# Patient Record
Sex: FEMALE | Race: WHITE | NOT HISPANIC OR LATINO | URBAN - METROPOLITAN AREA
[De-identification: names, ages, dates, MRNs, and addresses within clinical notes are randomized per-mention and may not be internally consistent; named-entity substitution may affect disease eponyms.]

---

## 2020-07-20 ENCOUNTER — VIRTUAL VISIT (OUTPATIENT)
Dept: FAMILY MEDICINE | Facility: OTHER | Age: 25
End: 2020-07-20

## 2020-07-21 DIAGNOSIS — R05.9 COUGH: Primary | ICD-10-CM

## 2020-07-21 PROCEDURE — U0003 INFECTIOUS AGENT DETECTION BY NUCLEIC ACID (DNA OR RNA); SEVERE ACUTE RESPIRATORY SYNDROME CORONAVIRUS 2 (SARS-COV-2) (CORONAVIRUS DISEASE [COVID-19]), AMPLIFIED PROBE TECHNIQUE, MAKING USE OF HIGH THROUGHPUT TECHNOLOGIES AS DESCRIBED BY CMS-2020-01-R: HCPCS | Performed by: FAMILY MEDICINE

## 2020-07-21 NOTE — LETTER
July 24, 2020        Gisela FREDO [address]  [place] [address]    This letter provides a written record that you were tested for COVID-19 on 7/21/20.       Your result was negative. This means that we didn t find the virus that causes COVID-19 in your sample. A test may show negative when you do actually have the virus. This can happen when the virus is in the early stages of infection, before you feel illness symptoms.    If you have symptoms   Stay home and away from others (self-isolate) until you meet ALL of the guidelines below:    You ve had no fever--and no medicine that reduces fever--for 3 full days (72 hours). And      Your other symptoms have gotten better. For example, your cough or breathing has improved. And     At least 10 days have passed since your symptoms started.    During this time:    Stay home. Don t go to work, school or anywhere else.     Stay in your own room, including for meals. Use your own bathroom if you can.    Stay away from others in your home. No hugging, kissing or shaking hands. No visitors.    Clean  high touch  surfaces often (doorknobs, counters, handles, etc.). Use a household cleaning spray or wipes. You can find a full list on the EPA website at www.epa.gov/pesticide-registration/list-n-disinfectants-use-against-sars-cov-2.    Cover your mouth and nose with a mask, tissue or washcloth to avoid spreading germs.    Wash your hands and face often with soap and water.    Going back to work  Check with your employer for any guidelines to follow for going back to work.    Employers: This document serves as formal notice that your employee tested negative for COVID-19, as of the testing date shown above.

## 2020-07-22 LAB
SARS-COV-2 RNA SPEC QL NAA+PROBE: NOT DETECTED
SPECIMEN SOURCE: NORMAL

## 2020-07-22 NOTE — PROGRESS NOTES
"Date: 2020 10:28:32  Clinician: Ricki Padilla  Clinician NPI: [id number]  Patient: JEAN TURNER  Patient : 1995  Patient Address: [address], [place] [address]  Patient Phone: [phone]  Visit Protocol: URI  Patient Summary:  JEAN is a 25 year old ( : 1995 ) female who initiated a Visit for COVID-19 (Coronavirus) evaluation and screening. When asked the question \"Please sign me up to receive news, health information and promotions. \", JEAN responded \"No\".    JEAN states her symptoms started 1-2 days ago.   Her symptoms consist of malaise, a headache, and a cough.   Symptom details     Cough: JEAN coughs a few times an hour and her cough is not more bothersome at night. Phlegm does not come into her throat when she coughs. She does not believe her cough is caused by post-nasal drip.     Headache: She states the headache is mild (1-3 on a 10 point pain scale).      JEAN denies having wheezing, nausea, teeth pain, ageusia, diarrhea, vomiting, rhinitis, ear pain, chills, sore throat, myalgias, anosmia, facial pain or pressure, fever, and nasal congestion. She also denies having recent facial or sinus surgery in the past 60 days and taking antibiotic medication in the past month. She is not experiencing dyspnea.   Precipitating events  She has not recently been exposed to someone with influenza. JEAN has not been in close contact with any high risk individuals.   Pertinent COVID-19 (Coronavirus) information  In the past 14 days, JEAN has not worked in a congregate living setting.   She does not work or volunteer as healthcare worker or a  and does not work or volunteer in a healthcare facility.   JEAN also has not lived in a congregate living setting in the past 14 days. She does not live with a healthcare worker.   JEAN has not had a close contact with a laboratory-confirmed COVID-19 patient within 14 days of symptom onset.   " Pertinent medical history  JEAN does not get yeast infections when she takes antibiotics.   JEAN does not need a return to work/school note.   Weight: 135 lbs   JEAN does not smoke or use smokeless tobacco.   She denies pregnancy and denies breastfeeding. She has menstruated in the past month.   Weight: 135 lbs    MEDICATIONS: No current medications, ALLERGIES: NKDA  Clinician Response:  Dear JEAN,   Your symptoms show that you may have coronavirus (COVID-19). This illness can cause fever, cough and trouble breathing. Many people get a mild case and get better on their own. Some people can get very sick.  What should I do?  We would like to test you for this virus.   1. Please call [phone] to schedule your visit. Explain that you were referred by [facility] to have a COVID-19 test. Be ready to share your [facility] visit ID number.  The following will serve as your written order for this COVID Test, ordered by me, for the indication of suspected COVID [Z20.828]: The test will be ordered in [employer], our electronic health record, after you are scheduled. It will show as ordered and authorized by Toni Grant MD.  Order: COVID-19 (Coronavirus) PCR for SYMPTOMATIC testing from [facility].      2. When it's time for your COVID test:  Stay at least 6 feet away from others. (If someone will drive you to your test, stay in the backseat, as far away from the  as you can.)   Cover your mouth and nose with a mask, tissue or washcloth.  Go straight to the testing site. Don't make any stops on the way there or back.      3.Starting now: Stay home and away from others (self-isolate) until:   You've had no fever---and no medicine that reduces fever---for 3 full days (72 hours). And...   Your other symptoms have gotten better. For example, your cough or breathing has improved. And...   At least 10 days have passed since your symptoms started.       During this time, don't leave the house except for testing or medical  "care.   Stay in your own room, even for meals. Use your own bathroom if you can.   Stay away from others in your home. No hugging, kissing or shaking hands. No visitors.  Don't go to work, school or anywhere else.    Clean \"high touch\" surfaces often (doorknobs, counters, handles, etc.). Use a household cleaning spray or wipes. You'll find a full list of  on the EPA website: www.epa.gov/pesticide-registration/list-n-disinfectants-use-against-sars-cov-2.   Cover your mouth and nose with a mask, tissue or washcloth to avoid spreading germs.  Wash your hands and face often. Use soap and water.  Caregivers in these groups are at risk for severe illness due to COVID-19:  o People 65 years and older  o People who live in a nursing home or long-term care facility  o People with chronic disease (lung, heart, cancer, diabetes, kidney, liver, immunologic)  o People who have a weakened immune system, including those who:   Are in cancer treatment  Take medicine that weakens the immune system, such as corticosteroids  Had a bone marrow or organ transplant  Have an immune deficiency  Have poorly controlled HIV or AIDS  Are obese (body mass index of 40 or higher)  Smoke regularly   o Caregivers should wear gloves while washing dishes, handling laundry and cleaning bedrooms and bathrooms.  o Use caution when washing and drying laundry: Don't shake dirty laundry, and use the warmest water setting that you can.  o For more tips, go to www.cdc.gov/coronavirus/2019-ncov/downloads/10Things.pdf.    4.Sign up for [employer]. We know it's scary to hear that you might have COVID-19. We want to track your symptoms to make sure you're okay over the next 2 weeks. Please look for an email from [facility]---this is a free, online program that we'll use to keep in touch. To sign up, follow the link in the email. Learn more at [email or web address]  How can I take care of myself?   Get lots of rest. Drink extra fluids " (unless a doctor has told you not to).   Take Tylenol (acetaminophen) for fever or pain. If you have liver or kidney problems, ask your family doctor if it's okay to take Tylenol.   Adults can take either:    650 mg (two 325 mg pills) every 4 to 6 hours, or...   1,000 mg (two 500 mg pills) every 8 hours as needed.    Note: Don't take more than 3,000 mg in one day. Acetaminophen is found in many medicines (both prescribed and over-the-counter medicines). Read all labels to be sure you don't take too much.   For children, check the Tylenol bottle for the right dose. The dose is based on the child's age or weight.    If you have other health problems (like cancer, heart failure, an organ transplant or severe kidney disease): Call your specialty clinic if you don't feel better in the next 2 days.       Know when to call 911. Emergency warning signs include:    Trouble breathing or shortness of breath Pain or pressure in the chest that doesn't go away Feeling confused like you haven't felt before, or not being able to wake up Bluish-colored lips or face.  Where can I get more information?   [facility] -- About COVID-19: [email or web address]   CDC -- What to Do If You're Sick: www.cdc.gov/coronavirus/2019-ncov/about/steps-when-sick.html   CDC -- Ending Home Isolation: www.cdc.gov/coronavirus/2019-ncov/hcp/disposition-in-home-patients.html   CDC -- Caring for Someone: www.cdc.gov/coronavirus/2019-ncov/if-you-are-sick/care-for-someone.html   [facility] -- Interim Guidance for Hospital Discharge to Home: www.health.[facility].[place]/diseases/coronavirus/hcp/hospdischarge.pdf   [facility] clinical trials (COVID-19 research studies): clinicalaffairs.[facility]/umn-clinical-trials    Below are the COVID-19 hotlines at the [place] Department of Health ([facility]). Interpreters are available.    For health questions: Call 526-360-9868 or 1-508.437.6784 (7 a.m. to 7 p.m.) For questions about schools and childcare: Call  507-099-8436 or 1-821.605.8038 (7 a.m. to 7 p.m.)    Diagnosis: Cough  Diagnosis ICD: R05

## 2020-11-11 ASSESSMENT — ANXIETY QUESTIONNAIRES
2. NOT BEING ABLE TO STOP OR CONTROL WORRYING: NOT AT ALL
7. FEELING AFRAID AS IF SOMETHING AWFUL MIGHT HAPPEN: NOT AT ALL
3. WORRYING TOO MUCH ABOUT DIFFERENT THINGS: NOT AT ALL
1. FEELING NERVOUS, ANXIOUS, OR ON EDGE: NOT AT ALL
6. BECOMING EASILY ANNOYED OR IRRITABLE: SEVERAL DAYS
4. TROUBLE RELAXING: NOT AT ALL
5. BEING SO RESTLESS THAT IT IS HARD TO SIT STILL: NOT AT ALL
GAD7 TOTAL SCORE: 1

## 2020-11-12 ASSESSMENT — ENCOUNTER SYMPTOMS
BACK PAIN: 0
NECK PAIN: 0
MUSCLE CRAMPS: 0
ARTHRALGIAS: 1
MYALGIAS: 0
JOINT SWELLING: 0
STIFFNESS: 1
SKIN CHANGES: 0
MUSCLE WEAKNESS: 0
NAIL CHANGES: 0
POOR WOUND HEALING: 0

## 2020-11-12 ASSESSMENT — ANXIETY QUESTIONNAIRES
GAD7 TOTAL SCORE: 1
GAD7 TOTAL SCORE: 1
7. FEELING AFRAID AS IF SOMETHING AWFUL MIGHT HAPPEN: NOT AT ALL

## 2020-11-13 ASSESSMENT — ANXIETY QUESTIONNAIRES: GAD7 TOTAL SCORE: 1

## 2020-12-02 NOTE — PROGRESS NOTES
Progress Note    SUBJECTIVE:  Gisela Madison is an 25 year old, , who requests an Annual Preventive Exam. 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 She now lives in [place] with her boyfriend.     Concerns today include:   1. Preventative health visit: requests breast exam today. Pap up to date. Desires flu shot today.     2. Feels hormones may be out of line. Hx of acne that is difficult to treat. Has facial hair on chin that she gets lasered. Also reports excess hair on inner thighs. Gisela also notes feeling cold all the time; she denies constipation, mental sluggishness, weight gain, dry skin/hair/nails.     3. Baseline stuffy nose for last few months. Questioning allergies? Doesn't feel 100% on most days.This is tolerable but wondering why she feels this.     Contraception: Kyleena IUD inserted by Dr Day 19 during pelvic u/s which had normal findings    Sexual health: no concerns, denies dyspareunia and vaginal symptoms     Declines STI testing as she is in a monogamous relationship. Last in 2019- negative for gonorrhea and chlamydia.      Mental health: doing as best as she can right now. No concerns    Diet: healthy diet with adequate fruits, vegetables, and calcium    Exercise: 3-5 days per week     Menstrual History: menses every month. Reports premenstrual symptoms of breast tenderness, cramping, and mood changes. Hx of regular periods. Menarche age 9-10 years old with regular cycles since then.   Menstrual History 12/3/2020 12/3/2020   LAST MENSTRUAL PERIOD 2020 -   Menarche Age - 10   Period Cycle (Days) - kyleena iud 28-30 day   Period Duration (Days) - 2-3  then spots for a week   Method of Contraception - Other (see comments)   Period Pattern - Regular   Menstrual Flow - Light   Dysmenorrhea - Mild   PMS Symptoms - Cramping;Mood Changes   Reviewed Today - Yes     Last Pap: 2019- NIL  History  of abnormal Pap smear: NO - age 21-29 PAP every 3 years recommended    Mammogram current: not applicable    Last Colonoscopy: n/a    HISTORY:       levonorgestrel (KYLEENA) 19.5 MG IUD, 1 each by Intrauterine route       zolpidem (AMBIEN) 5 MG tablet, Take 5 mg by mouth as needed       tretinoin (RETIN-A) 0.025 % external cream, 1 g daily    No current facility-administered medications on file prior to visit.     No Known Allergies  Immunization History   Administered Date(s) Administered     DTaP, Unspecified 2012     HPV Quadrivalent 2010, 10/28/2010, 2011     Hep B, Peds or Adolescent 1995, 1995, 1995     HepA-ped 2 Dose 2006, 2007     Hpv, Unspecified  2010, 10/28/2010, 2011     Influenza Vaccine IM > 6 months Valent IIV4 2018, 2020, 2020     MENINGOCOCCAL, HISTORIC, UNKOWN SEROGROUPS 2010     Mantoux Tuberculin Skin Test 2018     Meningococcal,unspecified 2010     Typhoid Oral 2019     OB History    Para Term  AB Living   0 0 0 0 0 0   SAB TAB Ectopic Multiple Live Births   0 0 0 0 0     History reviewed. No pertinent past medical history.  Past Surgical History:   Procedure Laterality Date     anoidectomy       AS RAD RESEC TONSIL/PILLARS       LASIK       wisdom teeth       Family History   Problem Relation Age of Onset     Skin Cancer Mother      Thyroid Disease Mother         multinodular goiter     Diabetes Paternal Grandfather      Skin Cancer Maternal Aunt      Social History     Socioeconomic History     Marital status: Single     Spouse name: None     Number of children: None     Years of education: None     Highest education level: None   Occupational History     None   Social Needs     Financial resource strain: None     Food insecurity     Worry: None     Inability: None     Transportation needs     Medical: None     Non-medical: None   Tobacco Use     Smoking status: Never Smoker      Smokeless tobacco: Never Used   Substance and Sexual Activity     Alcohol use: Yes     Frequency: Monthly or less     Drinks per session: 1 or 2     Binge frequency: Never     Comment: occasionally     Drug use: Never     Sexual activity: Yes     Partners: Male     Birth control/protection: I.U.D.   Lifestyle     Physical activity     Days per week: None     Minutes per session: None     Stress: None   Relationships     Social connections     Talks on phone: None     Gets together: None     Attends Worship service: None     Active member of club or organization: None     Attends meetings of clubs or organizations: None     Relationship status: None     Intimate partner violence     Fear of current or ex partner: None     Emotionally abused: None     Physically abused: None     Forced sexual activity: None   Other Topics Concern     None   Social History Narrative    How much exercise per week? 3-5 days    How much calcium per day? [place] milk and foods       How much caffeine per day? 2 cups    How much vitamin D per day? In foods    Do you/your family wear seatbelts?  Yes    Do you/your family use safety helmets? Yes    Do you/your family use sunscreen? Yes    Do you/your family keep firearms in the home? No    Do you/your family have a smoke detector(s)? Yes    [facility] 12-3-2020       Answers for HPI/ROS submitted by the patient on 11/12/2020   CANDACE 7 TOTAL SCORE: 1  General Symptoms: No  Skin Symptoms: Yes  HENT Symptoms: No  EYE SYMPTOMS: No  HEART SYMPTOMS: No  LUNG SYMPTOMS: No  INTESTINAL SYMPTOMS: No  URINARY SYMPTOMS: No  GYNECOLOGIC SYMPTOMS: No  BREAST SYMPTOMS: No  SKELETAL SYMPTOMS: Yes  BLOOD SYMPTOMS: No  NERVOUS SYSTEM SYMPTOMS: No  MENTAL HEALTH SYMPTOMS: No  Changes in hair: Yes  Changes in moles/birth marks: No  Itching: No  Rashes: No  Changes in nails: No  Acne: No  Hair in places you don't want it: Yes  Change in facial hair: Yes  Warts: Yes  Non-healing sores: No  Scarring: No  Flaking of  "skin: No  Color changes of hands/feet in cold : No  Sun sensitivity: No  Skin thickening: No  Back pain: No  Muscle aches: No  Neck pain: No  Swollen joints: No  Joint pain: Yes  Bone pain: No  Muscle cramps: No  Muscle weakness: No  Joint stiffness: Yes  Bone fracture: No    PHQ-9 SCORE 12/3/2020   PHQ-9 Total Score 1     CANDACE-7 SCORE 11/11/2020 11/11/2020   Total Score 1 (minimal anxiety) 1 (minimal anxiety)   Total Score 1 1       EXAM:  Blood pressure 117/75, pulse 66, height 1.626 m (5' 4\"), weight 61.2 kg (135 lb), last menstrual period 11/11/2020. Body mass index is 23.17 kg/m .  General - pleasant female in no acute distress.  Skin - no suspicious lesions or rashes  EENT-  euthyroid with out palpable nodules  Neck - supple without lymphadenopathy.  Lungs - clear to auscultation bilaterally.  Heart - regular rate and rhythm without murmur.  Abdomen - BS active x4, soft, nontender, nondistended, no masses or organomegaly noted.  Musculoskeletal - no gross deformities.  Neurological - normal strength, sensation, and mental status.    Breast Exam:  Breast: Without visible skin changes. No dimpling or lesions seen.   Breasts supple, non-tender with palpation, no dominant mass, nodularity, or nipple discharge noted bilaterally. Axillary nodes negative.      Pelvic Exam:  EG/BUS: Normal genital architecture without lesions, erythema or abnormal secretions; Bartholin's, Urethra, Robert Lee's normal   Urethral meatus: normal   Urethra: no masses, tenderness, or scarring   Bladder: no masses or tenderness   Vagina: moist, pink, rugae with creamy, white and odorless secretions  Cervix: no lesions and IUD strings extending from external os.  Uterus: anteverted, non-tender, mobile  Adnexa: Within normal limits and No masses, nodularity, tenderness  Rectum: anus normal     ASSESSMENT:  Encounter Diagnoses   Name Primary?     Visit for preventive health examination Yes     Screening for thyroid disorder      Screening for " cholesterol level      Sensation of feeling cold      Hirsutism      Surveillance of intrauterine contraceptive device      PLAN:   Orders Placed This Encounter   Procedures     FLU VAC PRESRV FREE QUAD SPLIT VIR 3+YRS IM     TSH with free T4 reflex     Lipid Profile     Testosterone Free and Total   - TSH to assess for thyroid disorder  - Testosterone to assess for PCOS   - Lipid panel for preventative health   - Flu vaccine given today    - Bimanual and pelvic exam performed to check for IUD strings.  Normal IUD string check today; no evidence of displacement. Educated on how to check IUD strings at home. Kyleeena IUD effective until 6/2024.  - Sent patient [employer] message to advise use of antihistamine to manage symptoms of stuffy nose. Notify provider if symptoms persist.     - Flu vaccine given today  - Next pap smear due 2022    Will notify patient of results by [employer] if normal; will call if abnormal to discuss plan for care.     Additional teaching done at this visit regarding calcium (1200 mg per day), self breast exam, exercise, birth control, mental health and weight/diet.    Return to clinic in one year.  Follow-up as needed.    I, Roxann Hamilton, completed the PFSH and ROS. I then acted as a scribe for MARYJO Sellers, for the remainder of the visit.  Roxann Hamilton BSN, RN, MANJULA, EDUARDO Student     I agree with the PFSH and ROS as completed by the MARYJO Student, except for changes made by me.  The remainder of the encounter was performed by me and scribed by the MARYJO Student.  The scribed note accurately reflects my personal services and decisions made by me.  Kiki Lerner DNP, MAYO, MARYJO

## 2020-12-03 ENCOUNTER — OFFICE VISIT (OUTPATIENT)
Dept: OBGYN | Facility: CLINIC | Age: 25
End: 2020-12-03
Attending: NURSE PRACTITIONER
Payer: COMMERCIAL

## 2020-12-03 VITALS
HEART RATE: 66 BPM | HEIGHT: 64 IN | SYSTOLIC BLOOD PRESSURE: 117 MMHG | WEIGHT: 135 LBS | BODY MASS INDEX: 23.05 KG/M2 | DIASTOLIC BLOOD PRESSURE: 75 MMHG

## 2020-12-03 DIAGNOSIS — Z00.00 VISIT FOR PREVENTIVE HEALTH EXAMINATION: Primary | ICD-10-CM

## 2020-12-03 DIAGNOSIS — Z13.29 SCREENING FOR THYROID DISORDER: ICD-10-CM

## 2020-12-03 DIAGNOSIS — L68.0 HIRSUTISM: ICD-10-CM

## 2020-12-03 DIAGNOSIS — Z30.431 SURVEILLANCE OF INTRAUTERINE CONTRACEPTIVE DEVICE: ICD-10-CM

## 2020-12-03 DIAGNOSIS — Z13.220 SCREENING FOR CHOLESTEROL LEVEL: ICD-10-CM

## 2020-12-03 DIAGNOSIS — R68.89 SENSATION OF FEELING COLD: ICD-10-CM

## 2020-12-03 LAB
CHOLEST SERPL-MCNC: 149 MG/DL
HDLC SERPL-MCNC: 64 MG/DL
LDLC SERPL CALC-MCNC: 66 MG/DL
NONHDLC SERPL-MCNC: 85 MG/DL
TRIGL SERPL-MCNC: 95 MG/DL
TSH SERPL DL<=0.005 MIU/L-ACNC: 1.87 MU/L (ref 0.4–4)

## 2020-12-03 PROCEDURE — 80061 LIPID PANEL: CPT | Performed by: NURSE PRACTITIONER

## 2020-12-03 PROCEDURE — 84443 ASSAY THYROID STIM HORMONE: CPT | Performed by: NURSE PRACTITIONER

## 2020-12-03 PROCEDURE — 36415 COLL VENOUS BLD VENIPUNCTURE: CPT | Performed by: NURSE PRACTITIONER

## 2020-12-03 PROCEDURE — G0008 ADMIN INFLUENZA VIRUS VAC: HCPCS

## 2020-12-03 PROCEDURE — G0463 HOSPITAL OUTPT CLINIC VISIT: HCPCS | Mod: 25

## 2020-12-03 PROCEDURE — 84403 ASSAY OF TOTAL TESTOSTERONE: CPT | Performed by: NURSE PRACTITIONER

## 2020-12-03 PROCEDURE — 90686 IIV4 VACC NO PRSV 0.5 ML IM: CPT

## 2020-12-03 PROCEDURE — 250N000011 HC RX IP 250 OP 636

## 2020-12-03 PROCEDURE — 84270 ASSAY OF SEX HORMONE GLOBUL: CPT | Performed by: NURSE PRACTITIONER

## 2020-12-03 PROCEDURE — 99385 PREV VISIT NEW AGE 18-39: CPT | Performed by: NURSE PRACTITIONER

## 2020-12-03 RX ORDER — TRETINOIN 0.25 MG/G
1 CREAM TOPICAL DAILY
COMMUNITY
Start: 2020-11-10

## 2020-12-03 RX ORDER — ZOLPIDEM TARTRATE 5 MG/1
5 TABLET ORAL PRN
COMMUNITY
Start: 2019-07-08

## 2020-12-03 SDOH — HEALTH STABILITY: MENTAL HEALTH: HOW OFTEN DO YOU HAVE 6 OR MORE DRINKS ON ONE OCCASION?: NEVER

## 2020-12-03 SDOH — HEALTH STABILITY: MENTAL HEALTH: HOW OFTEN DO YOU HAVE A DRINK CONTAINING ALCOHOL?: MONTHLY OR LESS

## 2020-12-03 SDOH — HEALTH STABILITY: MENTAL HEALTH: HOW MANY STANDARD DRINKS CONTAINING ALCOHOL DO YOU HAVE ON A TYPICAL DAY?: 1 OR 2

## 2020-12-03 ASSESSMENT — MIFFLIN-ST. JEOR: SCORE: 1342.36

## 2020-12-03 ASSESSMENT — PAIN SCALES - GENERAL: PAINLEVEL: NO PAIN (0)

## 2020-12-03 ASSESSMENT — PATIENT HEALTH QUESTIONNAIRE - PHQ9: SUM OF ALL RESPONSES TO PHQ QUESTIONS 1-9: 1

## 2020-12-03 NOTE — LETTER
12/3/2020       RE: Gisela Madison  [address]  [place] [address]     Dear Colleague,    Thank you for referring your patient, Gisela Madison, to the [facility] [place] at [facility]. Please see a copy of my visit note below.    Progress Note    SUBJECTIVE:  Gisela Madison is an 25 year old, , who requests an Annual Preventive Exam. 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 She now lives in [place] with her boyfriend.     Concerns today include:   1. Preventative health visit: requests breast exam today. Pap up to date. Desires flu shot today.     2. Feels hormones may be out of line. Hx of acne that is difficult to treat. Has facial hair on chin that she gets lasered. Also reports excess hair on inner thighs. Gisela also notes feeling cold all the time; she denies constipation, mental sluggishness, weight gain, dry skin/hair/nails.     3. Baseline stuffy nose for last few months. Questioning allergies? Doesn't feel 100% on most days.This is tolerable but wondering why she feels this.     Contraception: Kyleena IUD inserted by Dr Day 19 during pelvic u/s which had normal findings    Sexual health: no concerns, denies dyspareunia and vaginal symptoms     Declines STI testing as she is in a monogamous relationship. Last in 2019- negative for gonorrhea and chlamydia.      Mental health: doing as best as she can right now. No concerns    Diet: healthy diet with adequate fruits, vegetables, and calcium    Exercise: 3-5 days per week     Menstrual History: menses every month. Reports premenstrual symptoms of breast tenderness, cramping, and mood changes. Hx of regular periods. Menarche age 9-10 years old with regular cycles since then.   Menstrual History 12/3/2020 12/3/2020   LAST MENSTRUAL PERIOD 2020 -   Menarche Age - 10   Period  Cycle (Days) - kyleena iud 28-30 day   Period Duration (Days) - 2-3  then spots for a week   Method of Contraception - Other (see comments)   Period Pattern - Regular   Menstrual Flow - Light   Dysmenorrhea - Mild   PMS Symptoms - Cramping;Mood Changes   Reviewed Today - Yes     Last Pap: 2019- NIL  History of abnormal Pap smear: NO - age 21-29 PAP every 3 years recommended    Mammogram current: not applicable    Last Colonoscopy: n/a    HISTORY:       levonorgestrel (KYLEENA) 19.5 MG IUD, 1 each by Intrauterine route       zolpidem (AMBIEN) 5 MG tablet, Take 5 mg by mouth as needed       tretinoin (RETIN-A) 0.025 % external cream, 1 g daily    No current facility-administered medications on file prior to visit.     No Known Allergies  Immunization History   Administered Date(s) Administered     DTaP, Unspecified 2012     HPV Quadrivalent 2010, 10/28/2010, 2011     Hep B, Peds or Adolescent 1995, 1995, 1995     HepA-ped 2 Dose 2006, 2007     Hpv, Unspecified  2010, 10/28/2010, 2011     Influenza Vaccine IM > 6 months Valent IIV4 2018, 2020, 2020     MENINGOCOCCAL, HISTORIC, UNKOWN SEROGROUPS 2010     Mantoux Tuberculin Skin Test 2018     Meningococcal,unspecified 2010     Typhoid Oral 2019     OB History    Para Term  AB Living   0 0 0 0 0 0   SAB TAB Ectopic Multiple Live Births   0 0 0 0 0     History reviewed. No pertinent past medical history.  Past Surgical History:   Procedure Laterality Date     anoidectomy       AS RAD RESEC TONSIL/PILLARS       LASIK       wisdom teeth       Family History   Problem Relation Age of Onset     Skin Cancer Mother      Thyroid Disease Mother         multinodular goiter     Diabetes Paternal Grandfather      Skin Cancer Maternal Aunt      Social History     Socioeconomic History     Marital status: Single     Spouse name: None     Number of children: None      Years of education: None     Highest education level: None   Occupational History     None   Social Needs     Financial resource strain: None     Food insecurity     Worry: None     Inability: None     Transportation needs     Medical: None     Non-medical: None   Tobacco Use     Smoking status: Never Smoker     Smokeless tobacco: Never Used   Substance and Sexual Activity     Alcohol use: Yes     Frequency: Monthly or less     Drinks per session: 1 or 2     Binge frequency: Never     Comment: occasionally     Drug use: Never     Sexual activity: Yes     Partners: Male     Birth control/protection: I.U.D.   Lifestyle     Physical activity     Days per week: None     Minutes per session: None     Stress: None   Relationships     Social connections     Talks on phone: None     Gets together: None     Attends Cheondoism service: None     Active member of club or organization: None     Attends meetings of clubs or organizations: None     Relationship status: None     Intimate partner violence     Fear of current or ex partner: None     Emotionally abused: None     Physically abused: None     Forced sexual activity: None   Other Topics Concern     None   Social History Narrative    How much exercise per week? 3-5 days    How much calcium per day? Alomond milk and foods       How much caffeine per day? 2 cups    How much vitamin D per day? In foods    Do you/your family wear seatbelts?  Yes    Do you/your family use safety helmets? Yes    Do you/your family use sunscreen? Yes    Do you/your family keep firearms in the home? No    Do you/your family have a smoke detector(s)? Yes    [facility] 12-3-2020       Answers for HPI/ROS submitted by the patient on 11/12/2020   CANDACE 7 TOTAL SCORE: 1  General Symptoms: No  Skin Symptoms: Yes  HENT Symptoms: No  EYE SYMPTOMS: No  HEART SYMPTOMS: No  LUNG SYMPTOMS: No  INTESTINAL SYMPTOMS: No  URINARY SYMPTOMS: No  GYNECOLOGIC SYMPTOMS: No  BREAST SYMPTOMS: No  SKELETAL SYMPTOMS:  "Yes  BLOOD SYMPTOMS: No  NERVOUS SYSTEM SYMPTOMS: No  MENTAL HEALTH SYMPTOMS: No  Changes in hair: Yes  Changes in moles/birth marks: No  Itching: No  Rashes: No  Changes in nails: No  Acne: No  Hair in places you don't want it: Yes  Change in facial hair: Yes  Warts: Yes  Non-healing sores: No  Scarring: No  Flaking of skin: No  Color changes of hands/feet in cold : No  Sun sensitivity: No  Skin thickening: No  Back pain: No  Muscle aches: No  Neck pain: No  Swollen joints: No  Joint pain: Yes  Bone pain: No  Muscle cramps: No  Muscle weakness: No  Joint stiffness: Yes  Bone fracture: No    PHQ-9 SCORE 12/3/2020   PHQ-9 Total Score 1     CANDACE-7 SCORE 11/11/2020 11/11/2020   Total Score 1 (minimal anxiety) 1 (minimal anxiety)   Total Score 1 1       EXAM:  Blood pressure 117/75, pulse 66, height 1.626 m (5' 4\"), weight 61.2 kg (135 lb), last menstrual period 11/11/2020. Body mass index is 23.17 kg/m .  General - pleasant female in no acute distress.  Skin - no suspicious lesions or rashes  EENT-  euthyroid with out palpable nodules  Neck - supple without lymphadenopathy.  Lungs - clear to auscultation bilaterally.  Heart - regular rate and rhythm without murmur.  Abdomen - BS active x4, soft, nontender, nondistended, no masses or organomegaly noted.  Musculoskeletal - no gross deformities.  Neurological - normal strength, sensation, and mental status.    Breast Exam:  Breast: Without visible skin changes. No dimpling or lesions seen.   Breasts supple, non-tender with palpation, no dominant mass, nodularity, or nipple discharge noted bilaterally. Axillary nodes negative.      Pelvic Exam:  EG/BUS: Normal genital architecture without lesions, erythema or abnormal secretions; Bartholin's, Urethra, Niwot's normal   Urethral meatus: normal   Urethra: no masses, tenderness, or scarring   Bladder: no masses or tenderness   Vagina: moist, pink, rugae with creamy, white and odorless secretions  Cervix: no lesions and IUD " strings extending from external os.  Uterus: anteverted, non-tender, mobile  Adnexa: Within normal limits and No masses, nodularity, tenderness  Rectum: anus normal     ASSESSMENT:  Encounter Diagnoses   Name Primary?     Visit for preventive health examination Yes     Screening for thyroid disorder      Screening for cholesterol level      Sensation of feeling cold      Hirsutism      Surveillance of intrauterine contraceptive device      PLAN:   Orders Placed This Encounter   Procedures     FLU VAC PRESRV FREE QUAD SPLIT VIR 3+YRS IM     TSH with free T4 reflex     Lipid Profile     Testosterone Free and Total   - TSH to assess for thyroid disorder  - Testosterone to assess for PCOS   - Lipid panel for preventative health   - Flu vaccine given today    - Bimanual and pelvic exam performed to check for IUD strings.  Normal IUD string check today; no evidence of displacement. Educated on how to check IUD strings at home. Kyleeena IUD effective until 6/2024.  - Sent patient Porphyrio message to advise use of antihistamine to manage symptoms of stuffy nose. Notify provider if symptoms persist.     - Flu vaccine given today  - Next pap smear due 2022    Will notify patient of results by FreedomPopt if normal; will call if abnormal to discuss plan for care.     Additional teaching done at this visit regarding calcium (1200 mg per day), self breast exam, exercise, birth control, mental health and weight/diet.    Return to clinic in one year.  Follow-up as needed.    I, Roxann Hamilton, completed the PFSH and ROS. I then acted as a scribe for MARYJO Sellers, for the remainder of the visit.  Roxann Hamilton BSN, RN, MANJULA, EDUARDO Student     I agree with the PFSH and ROS as completed by the MARYJO Student, except for changes made by me.  The remainder of the encounter was performed by me and scribed by the MARYJO Student.  The scribed note accurately reflects my personal services and decisions made by me.  Kiki Lerner DNP,  MARYJO HUBER                Again, thank you for allowing me to participate in the care of your patient.      Sincerely,    MAYO Casey CNP

## 2020-12-03 NOTE — PATIENT INSTRUCTIONS
Flu vaccine was given today  Your next pap smear is due in 2022  Kyleena IUD is effective until 6/2024    Go to the lab today to have your testosterone, thyroid, and cholesterol levels checked.  You will be notified of results within 7 days by Brittny. If anything is abnormal, you will receive a phone call.      PREVENTIVE HEALTH RECOMMENDATIONS:   Most women need a yearly breast and pelvic exam.    A PAP screen, a test done DURING a pelvic exam, is NO longer recommended yearly.    March 2013, screening guidelines recommended by ACOG for PAP screen are:    1) First pap at age 21.    2) Pap every 3 years until age 30.    3) After age 30, pap every 3 years or Pap with HR HPV screen every 5 years until age 65.  4) Women do NOT need a vaginal Pap screen after a hysterectomy (surgical removal of the uterus) when they have not had cancer.    Exceptions:  1) Yearly pap if HIV+ or immunosuppressed secondary to organ transplant  2) ANN-MARIE II-III continue routine screening for 20 years.    I encourage you continue looking for opportunities to choose a healthy lifestyle:       * Choose to eat a heart healthy diet. Check out the FOOD PLATE guidelines at: http://www.choosemyplate.gov/ for helpful hints on weight and cholesterol management.  Balance your caloric intake with exercise to maintain a BMI in the 22 to 26 range. For bone health: Eat calcium-rich foods like yogurt, broccoli or take chewable calcium pills (500 to 600 mg) twice a day with food.       * Exercise for at least an average of 30 minutes a day, 5 days of the week. This will help you control your weight, release stress, and help prevent disease.      * Take a Vitamin D3 supplement daily fall through spring and during summer unless you [id number]' full body sun exposure to skin without sunscreen.      * DO wear sunscreen to prevent skin cancer after the first 15-30 minutes.      * Identify stressors in your life, find ways to release the stress, and, make time for  yourself. PLEASE ask for help if mood changes last longer than two weeks.     * Limit alcohol to one drink per day.  No smoking.  Avoid second hand smoke. If you smoke, ask for help to stop.       *  If you are in a sexual relationship, talk with your partner about possible infection risks and take action to protect yourself from exposure to a sexual infection.    Please request an infection screen for STIs (sexually transmitted infections) if you are less than age 26 OR believe that you may be at risk.     Get a flu shot each year. Get a tetanus shot every 10 years. EVERYONE needs a pertussis (Whooping cough) booster.    See your dentist twice a year for an exam and preventive care cleaning.     Consider the following screen tests:    1) cholesterol test every 5 years.     2) yearly mammogram after age 40 unless you have identified risks.    3) colonoscopy every 10 years after age 50 unless you have identified risks.    4) diabetes blood test screening if you are at risk for diabetes.      Additional information that you may also find helpful:  The Internet now gives us access to LOTS of information -- some of it helpful, research documented and also plenty of harmful, anecdotal information that may not pertain to your situtaion. Consider visiting the following websites for accurate health information:    www.vitamindcouncil.org/ : Info and ongoing research re Vitamin D    www.fairview.org : Up to date and easily searchable information on multiple topics.    www.medlineplus.gov : medication info, interactive tutorials, watch real surgeries online    www.cdc.gov : public health info, travel advisories, epidemics (H1N1)    www.sebastian/std.org: current research re diagnosis, treatment and prevention of sexually contacted infections.    www.health.Count includes the [facility].[place].us : [place] dept of heatlh, public health issues in [place], N1N1    www.familydoctor.org : good info from the Academy of Family Physicians

## 2020-12-03 NOTE — LETTER
Date:December 7, 2020      Patient was self referred, no letter generated. Do not send.        [facility] Health Information

## 2020-12-05 LAB
SHBG SERPL-SCNC: 52 NMOL/L (ref 30–135)
TESTOST FREE SERPL-MCNC: 0.37 NG/DL (ref 0.08–0.74)
TESTOST SERPL-MCNC: 28 NG/DL (ref 8–60)

## 2021-01-04 ENCOUNTER — HEALTH MAINTENANCE LETTER (OUTPATIENT)
Age: 26
End: 2021-01-04

## 2021-07-08 ENCOUNTER — NURSE TRIAGE (OUTPATIENT)
Dept: NURSING | Facility: CLINIC | Age: 26
End: 2021-07-08

## 2021-07-08 ENCOUNTER — TELEPHONE (OUTPATIENT)
Dept: OBGYN | Facility: CLINIC | Age: 26
End: 2021-07-08

## 2021-07-08 NOTE — TELEPHONE ENCOUNTER
"Triage call. Patient calling. Called to schedule an appointment.     Noticed a swollen area on the left side of her neck yesterday, also reports stiff neck.  The area is on the \"most lateral side\" of her neck, between jaw and collarbone.  Reports discomfort when touching the area.      Per protocol, see today or tomorrow in office.  Advised to be seen at urgent care if no appointments available.  Patient agrees to plan. Hours for [facility] urgent care given to patient. Transferred to scheduling.    Erum Milan RN 07/08/21 12:55 PM  [facility] Nurse Advisor    Reason for Disposition    Patient wants to be seen    Additional Information    Negative: Sounds like a life-threatening emergency to the triager    Negative: Node is in the neck and causes difficulty breathing    Negative: Patient sounds very sick or weak to the triager    Negative: Node is in the neck and can't swallow fluids    Negative: Fever > 103 F (39.4 C)    Negative: Lump or swelling in groin and pulsating (like heartbeat)    Negative: Single large node and size > 1 inch (2.5 cm)    Negative: Overlying skin is red    Negative: Rapid increase in size of node over several hours    Negative: Tender node in the groin and has a sore, scratch, cut, or painful red area on that leg    Negative: Tender node in the armpit and has a sore, scratch, cut, or painful red area on that arm    Negative: Tender node in the neck and also has a sore throat with minimal/no runny nose or cough    Negative: Fever present > 3 days (72 hours)    Negative: Large nodes at multiple locations    Negative: Very tender to the touch but no fever    Protocols used: LYMPH NODES - [id number]    COVID 19 Nurse Triage Plan/Patient Instructions    Please be aware that novel coronavirus (COVID-19) may be circulating in the community. If you develop symptoms such as fever, cough, or SOB or if you have concerns about the presence of another infection including " coronavirus (COVID-19), please contact your health care provider or visit [email or web address].     Disposition/Instructions    In-Person Visit with provider recommended. Reference Visit Selection Guide.    Thank you for taking steps to prevent the spread of this virus.  o Limit your contact with others.  o Wear a simple mask to cover your cough.  o Wash your hands well and often.    Resources    [facility]: About COVID-19: [email or web address]    CDC: What to Do If You're Sick: www.cdc.gov/coronavirus/2019-ncov/about/steps-when-sick.html    CDC: Ending Home Isolation: www.cdc.gov/coronavirus/2019-ncov/hcp/disposition-in-home-patients.html     CDC: Caring for Someone: www.cdc.gov/coronavirus/2019-ncov/if-you-are-sick/care-for-someone.html     [facility]: Interim Guidance for Hospital Discharge to Home: www.[facility].[place]./diseases/coronavirus/hcp/hospdischarge.pdf    [facility] clinical trials (COVID-19 research studies): clinicalaffairs.[facility]-clinical-trials     Below are the COVID-19 hotlines at the [place] Department of Health ([facility]). Interpreters are available.   o For health questions: Call 593-558-8706 or 1-716.752.5536 (7 a.m. to 7 p.m.)  o For questions about schools and childcare: Call 165-027-8538 or 1-223.743.7704 (7 a.m. to 7 p.m.)

## 2021-07-08 NOTE — TELEPHONE ENCOUNTER
[facility] Call Center    Phone Message    May a detailed message be left on voicemail: yes     Reason for Call: Other: Patient reporting lump on her neck that just appeared yesterday.  She appears to have called [facility] Nurse Line and was told to be seen today or tomorrow.  Please call to discuss.      Action Taken: Message routed to:  [facility] S    Travel Screening: Not Applicable

## 2021-07-13 ENCOUNTER — OFFICE VISIT (OUTPATIENT)
Dept: OBGYN | Facility: CLINIC | Age: 26
End: 2021-07-13
Attending: NURSE PRACTITIONER
Payer: COMMERCIAL

## 2021-07-13 ENCOUNTER — LAB (OUTPATIENT)
Dept: LAB | Facility: CLINIC | Age: 26
End: 2021-07-13
Attending: NURSE PRACTITIONER
Payer: COMMERCIAL

## 2021-07-13 VITALS
DIASTOLIC BLOOD PRESSURE: 77 MMHG | BODY MASS INDEX: 23.05 KG/M2 | SYSTOLIC BLOOD PRESSURE: 121 MMHG | HEIGHT: 64 IN | WEIGHT: 135 LBS | HEART RATE: 71 BPM | TEMPERATURE: 97.8 F

## 2021-07-13 DIAGNOSIS — R59.9 ENLARGED LYMPH NODES: ICD-10-CM

## 2021-07-13 DIAGNOSIS — R59.9 ENLARGED LYMPH NODES: Primary | ICD-10-CM

## 2021-07-13 LAB
ERYTHROCYTE [DISTWIDTH] IN BLOOD BY AUTOMATED COUNT: 12.2 % (ref 10–15)
HCT VFR BLD AUTO: 40.3 % (ref 35–47)
HGB BLD-MCNC: 13.2 G/DL (ref 11.7–15.7)
MCH RBC QN AUTO: 29.7 PG (ref 26.5–33)
MCHC RBC AUTO-ENTMCNC: 32.8 G/DL (ref 31.5–36.5)
MCV RBC AUTO: 91 FL (ref 78–100)
PLATELET # BLD AUTO: 269 10E3/UL (ref 150–450)
RBC # BLD AUTO: 4.44 10E6/UL (ref 3.8–5.2)
WBC # BLD AUTO: 6 10E3/UL (ref 4–11)

## 2021-07-13 PROCEDURE — 36415 COLL VENOUS BLD VENIPUNCTURE: CPT

## 2021-07-13 PROCEDURE — G0463 HOSPITAL OUTPT CLINIC VISIT: HCPCS

## 2021-07-13 PROCEDURE — 85027 COMPLETE CBC AUTOMATED: CPT

## 2021-07-13 PROCEDURE — 99213 OFFICE O/P EST LOW 20 MIN: CPT | Performed by: NURSE PRACTITIONER

## 2021-07-13 RX ORDER — OFLOXACIN 3 MG/ML
SOLUTION/ DROPS OPHTHALMIC
COMMUNITY
Start: 2021-05-17

## 2021-07-13 ASSESSMENT — ANXIETY QUESTIONNAIRES
6. BECOMING EASILY ANNOYED OR IRRITABLE: NOT AT ALL
2. NOT BEING ABLE TO STOP OR CONTROL WORRYING: NOT AT ALL
7. FEELING AFRAID AS IF SOMETHING AWFUL MIGHT HAPPEN: NOT AT ALL
3. WORRYING TOO MUCH ABOUT DIFFERENT THINGS: NOT AT ALL
GAD7 TOTAL SCORE: 0
1. FEELING NERVOUS, ANXIOUS, OR ON EDGE: NOT AT ALL
5. BEING SO RESTLESS THAT IT IS HARD TO SIT STILL: NOT AT ALL

## 2021-07-13 ASSESSMENT — MIFFLIN-ST. JEOR: SCORE: 1337.36

## 2021-07-13 ASSESSMENT — PATIENT HEALTH QUESTIONNAIRE - PHQ9
5. POOR APPETITE OR OVEREATING: NOT AT ALL
SUM OF ALL RESPONSES TO PHQ QUESTIONS 1-9: 1

## 2021-07-13 ASSESSMENT — PAIN SCALES - GENERAL: PAINLEVEL: MILD PAIN (2)

## 2021-07-13 NOTE — PROGRESS NOTES
"SUBJECTIVE: Gisela is a 26 year old female here with report of new onset \"lump\" on the left side of her neck. She states first noticing the lump 6 days ago (7/7).  She denies pain with palpation but increased tightness and some intermittent \"twinges\" along her left trapezius muscle with jogging that is more pronounced in the afternoon and evenings.  She does report recently returning from a vacation to the [facility] in [place]. She denies any strenuous activities while on vacation, or since returning from vacation. She denies any recent illness or trauma to the site. She also denies any recent history of illness, fever, chills, night sweats or weight loss. She states history of tonsillectomy and adenoidectomy as a child and wisdom tooth extraction in her teen years.    OBJECTIVE:   /77   Pulse 71   Temp 97.8  F (36.6  C) (Oral)   Ht 1.626 m (5' 4\")   Wt 61.2 kg (135 lb)   Breastfeeding No   BMI 23.17 kg/m    General: Alert, oriented, pleasant and cooperative female.  Psych: Pt is alert, oriented, calm and appropriate throughout this visit.   Skin: Pink, warm and dry with appropriate turgor. No rashes or lesion noted. No redness, lesion, or other skin change is visible to the left neck.  Respiratory: Respirations are easy, even and unlabored.  ENT: Enlarged single lymph node palpated in the posterior cervical lymph chain. No edema, warmth, or erythema noted to surrounding tissue.     ASSESSMENT:   Encounter Diagnosis   Name Primary?     Enlarged lymph nodes Yes       PLAN:   - ENT referral made for further evaluation.   - CBC with platelets ordered.  - Patient is encouraged to follow-up sooner if increased pain, fever, or swelling is noted.    Gisela expressed understanding and agreement with the plan for care.     I, Ines Mederos, completed the PFSH and ROS. I then acted as a scribe for MARYJO Sellers, for the remainder of the visit.  Ines Mederos BSN, RN, DNP student    I " agree with the PFSH and ROS as completed by the WHNP Student, except for changes made by me.  The remainder of the encounter was performed by me and scribed by the WHNP Student.  The scribed note accurately reflects my personal services and decisions made by me.  Kiki Lerner, DNP, APRN, ALANISNP

## 2021-07-13 NOTE — LETTER
Date:August 14, 2021      Patient was self referred, no letter generated. Do not send.        [facility] Health Information

## 2021-07-14 ENCOUNTER — TELEPHONE (OUTPATIENT)
Dept: OTOLARYNGOLOGY | Facility: CLINIC | Age: 26
End: 2021-07-14
Payer: COMMERCIAL

## 2021-07-14 ASSESSMENT — ANXIETY QUESTIONNAIRES: GAD7 TOTAL SCORE: 0

## 2021-07-14 NOTE — TELEPHONE ENCOUNTER
[facility] Call Center    Phone Message    May a detailed message be left on voicemail: no     Reason for Call: Appointment Intake    Referring Provider Name: Kiki Lerner APRN CNP in [facility]  Diagnosis and/or Symptoms: Enlarged lymph nodes [R59.9]    Action Taken: Message routed to:  [facility] [[id number]] - per protocols: Do not schedule. Send high-priority encounter to clinic pool.    Travel Screening: Not Applicable

## 2021-07-15 NOTE — TELEPHONE ENCOUNTER
FUTURE VISIT INFORMATION      FUTURE VISIT INFORMATION:    Date: 7/19/21    Time: 8 AM    [place] [facility]  REFERRAL INFORMATION:    Referring provider:  Kiki Lerner NP    Referring providers clinic:  [facility]    Reason for visit/diagnosis: Enlarges Lymph Nodes (left side of Neck)    RECORDS REQUESTED FROM:       Clinic name Comments Records Status Imaging Status   [facility] 7[facility]13/21 - OBGYN OV with Kiki Lerner NP  7/8/21 - Telephone note from patient with Nurse Erum Milan RN Epic

## 2021-07-19 ENCOUNTER — PRE VISIT (OUTPATIENT)
Dept: OTOLARYNGOLOGY | Facility: CLINIC | Age: 26
End: 2021-07-19

## 2021-07-19 ENCOUNTER — OFFICE VISIT (OUTPATIENT)
Dept: OTOLARYNGOLOGY | Facility: CLINIC | Age: 26
End: 2021-07-19
Payer: COMMERCIAL

## 2021-07-19 VITALS
OXYGEN SATURATION: 99 % | HEIGHT: 64 IN | BODY MASS INDEX: 23.03 KG/M2 | TEMPERATURE: 97.5 F | HEART RATE: 70 BPM | WEIGHT: 134.92 LBS

## 2021-07-19 DIAGNOSIS — R59.9 ENLARGED LYMPH NODES: Primary | ICD-10-CM

## 2021-07-19 PROCEDURE — 99204 OFFICE O/P NEW MOD 45 MIN: CPT | Mod: 25 | Performed by: STUDENT IN AN ORGANIZED HEALTH CARE EDUCATION/TRAINING PROGRAM

## 2021-07-19 PROCEDURE — 31575 DIAGNOSTIC LARYNGOSCOPY: CPT | Performed by: STUDENT IN AN ORGANIZED HEALTH CARE EDUCATION/TRAINING PROGRAM

## 2021-07-19 ASSESSMENT — MIFFLIN-ST. JEOR: SCORE: 1337

## 2021-07-19 ASSESSMENT — PAIN SCALES - GENERAL: PAINLEVEL: NO PAIN (0)

## 2021-07-19 NOTE — NURSING NOTE
"Chief Complaint   Patient presents with     RECHECK     UMp New, enlarged lymph nodes       Pulse 70, temperature 97.5  F (36.4  C), temperature source Temporal, height 1.626 m (5' 4\"), weight 61.2 kg (134 lb 14.7 oz), SpO2 99 %, not currently breastfeeding.    Roberto Mejia, EMT  "

## 2021-07-19 NOTE — LETTER
7/19/2021       RE: Gisela Madison  [address]  [place] [address]     Dear Colleague,    Thank you for referring your patient, Gisela Madison, to the [facility] [place] at [facility]. Please see a copy of my visit note below.    July 19, 2021      Kiki Lerner DNP  [facility]  [address]  [address], [place]  [address]      Dear Ms. Lerner,    I had the pleasure of meeting Ms. Madison today in clinic.      HISTORY OF PRESENT ILLNESS:  As you know, she is a pleasant 26-year-old healthy woman who you referred for evaluation of left cervical lymphadenopathy.  The patient approximately two weeks ago noticed a lump in her posterior left neck.  It was intermittently tender to begin with.  She has had no other head and neck symptoms.  Specifically, she denies any fevers, chills, night sweats, dysphagia, odynophagia, otalgia, hemoptysis or cough.  She has had no recent exotic travel and reports no sick contacts.    PAST MEDICAL HISTORY:  None.    PAST SURGICAL HISTORY:    1) Tonsillectomy and adenoidectomy.  2) LASIK.    MEDICATIONS:  IUD.    ALLERGIES:  No known drug allergies.    SOCIAL HISTORY:  She is a never smoker.  She drinks alcohol occasionally.    FAMILY HISTORY:  None.    REVIEW OF SYSTEMS:  A 10-point review of system was performed and was negative except as noted in the HPI.      PHYSICAL EXAMINATION:  She is alert, in no acute distress.  She has no lesions in the oral cavity or oropharynx.  She has a small palpable lymph node in the left level V that is mobile.  It measures approximately 1 cm.  It is soft.  There is no other lymphadenopathy appreciated.    PROCEDURE:  Fiberoptic laryngoscopy was performed after the nose was topically decongested and anesthetized.  The scope was advanced in the nasal cavity.  Nasopharynx  was clear.  The base of tongue, vallecula was clear.  The epiglottis was sharp without lesions.  The piriform sinuses were clear.  The vocal cords are mobile bilaterally without lesions.    ULTRASOUND:  An office ultrasound was performed today.  It demonstrated a 1 cm oval lymph node just deep to the subcutaneous tissue in left level V.  It was oval with a fatty hilum.  There was no suspicious lymphadenopathy seen.    ASSESSMENT AND PLAN:  Ms. Madison is a 26-year-old female presenting with a benign-appearing lymph node in left level V.  She has no head and neck symptoms and examination of the lymph node is completely benign.  I do not feel any other imaging or followup is needed at this time.  I have asked her to continue paying attention this lymph node and if it enlarges or she develops any symptoms, I would be happy to see her back in the future.    Thank you for allowing me to participate in the care of this patient. If you have any further questions, please do not hesitate to contact me.      Sincerely,      Amrit Kruger M.D.      Otolaryngology-Head & Neck Surgery   [facility]         45 minutes spent on the date of the encounter in chart review, patient visit, review of tests, documentation and/or discussion with other providers about the issues documented above.         Again, thank you for allowing me to participate in the care of your patient.      Sincerely,    Amrit Kruger MD

## 2021-07-19 NOTE — PROGRESS NOTES
July 19, 2021      Kiki Lerner [facility]  [address]  [address] [place] [address]      Dear Ms. Lerner,    I had the pleasure of meeting Ms. Madison today in clinic.      HISTORY OF PRESENT ILLNESS:  As you know, she is a pleasant 26-year-old healthy woman who you referred for evaluation of left cervical lymphadenopathy.  The patient approximately two weeks ago noticed a lump in her posterior left neck.  It was intermittently tender to begin with.  She has had no other head and neck symptoms.  Specifically, she denies any fevers, chills, night sweats, dysphagia, odynophagia, otalgia, hemoptysis or cough.  She has had no recent exotic travel and reports no sick contacts.    PAST MEDICAL HISTORY:  None.    PAST SURGICAL HISTORY:    1) Tonsillectomy and adenoidectomy.  2) LASIK.    MEDICATIONS:  IUD.    ALLERGIES:  No known drug allergies.    SOCIAL HISTORY:  She is a never smoker.  She drinks alcohol occasionally.    FAMILY HISTORY:  None.    REVIEW OF SYSTEMS:  A 10-point review of system was performed and was negative except as noted in the HPI.      PHYSICAL EXAMINATION:  She is alert, in no acute distress.  She has no lesions in the oral cavity or oropharynx.  She has a small palpable lymph node in the left level V that is mobile.  It measures approximately 1 cm.  It is soft.  There is no other lymphadenopathy appreciated.    PROCEDURE:  Fiberoptic laryngoscopy was performed after the nose was topically decongested and anesthetized.  The scope was advanced in the nasal cavity.  Nasopharynx was clear.  The base of tongue, vallecula was clear.  The epiglottis was sharp without lesions.  The piriform sinuses were clear.  The vocal cords are mobile bilaterally without lesions.    ULTRASOUND:  An office ultrasound was performed today.  It demonstrated a 1 cm oval lymph node just deep to the subcutaneous tissue in left  level V.  It was oval with a fatty hilum.  There was no suspicious lymphadenopathy seen.    ASSESSMENT AND PLAN:  Ms. Madison is a 26-year-old female presenting with a benign-appearing lymph node in left level V.  She has no head and neck symptoms and examination of the lymph node is completely benign.  I do not feel any other imaging or followup is needed at this time.  I have asked her to continue paying attention this lymph node and if it enlarges or she develops any symptoms, I would be happy to see her back in the future.    Thank you for allowing me to participate in the care of this patient. If you have any further questions, please do not hesitate to contact me.      Sincerely,      Amrit Kruger M.D.      Otolaryngology-Head & Neck Surgery   [facility]         45 minutes spent on the date of the encounter in chart review, patient visit, review of tests, documentation and/or discussion with other providers about the issues documented above.

## 2021-10-11 ENCOUNTER — HEALTH MAINTENANCE LETTER (OUTPATIENT)
Age: 26
End: 2021-10-11

## 2022-01-30 ENCOUNTER — HEALTH MAINTENANCE LETTER (OUTPATIENT)
Age: 27
End: 2022-01-30

## 2022-09-24 ENCOUNTER — HEALTH MAINTENANCE LETTER (OUTPATIENT)
Age: 27
End: 2022-09-24

## 2023-05-08 ENCOUNTER — HEALTH MAINTENANCE LETTER (OUTPATIENT)
Age: 28
End: 2023-05-08

## 2024-07-14 ENCOUNTER — HEALTH MAINTENANCE LETTER (OUTPATIENT)
Age: 29
End: 2024-07-14

## 2024-10-08 NOTE — LETTER
"7/13/2021       RE: Gisela Madison  [address]  [place] [address]     Dear Colleague,    Thank you for referring your patient, Gisela Madison, to the [facility] [place] [facility]. Please see a copy of my visit note below.    SUBJECTIVE: Gisela is a 26 year old female here with report of new onset \"lump\" on the left side of her neck. She states first noticing the lump 6 days ago (7/7).  She denies pain with palpation but increased tightness and some intermittent \"twinges\" along her left trapezius muscle with jogging that is more pronounced in the afternoon and evenings.  She does report recently returning from a vacation to the [facility] in [place]. She denies any strenuous activities while on vacation, or since returning from vacation. She denies any recent illness or trauma to the site. She also denies any recent history of illness, fever, chills, night sweats or weight loss. She states history of tonsillectomy and adenoidectomy as a child and wisdom tooth extraction in her teen years.    OBJECTIVE:   /77   Pulse 71   Temp 97.8  F (36.6  C) (Oral)   Ht 1.626 m (5' 4\")   Wt 61.2 kg (135 lb)   Breastfeeding No   BMI 23.17 kg/m    General: Alert, oriented, pleasant and cooperative female.  Psych: Pt is alert, oriented, calm and appropriate throughout this visit.   Skin: Pink, warm and dry with appropriate turgor. No rashes or lesion noted. No redness, lesion, or other skin change is visible to the left neck.  Respiratory: Respirations are easy, even and unlabored.  ENT: Enlarged single lymph node palpated in the posterior cervical lymph chain. No edema, warmth, or erythema noted to surrounding tissue.     ASSESSMENT:   Encounter Diagnosis   Name Primary?     Enlarged lymph nodes Yes       PLAN:   - ENT referral made for further evaluation.   - CBC with platelets ordered.  - Patient is encouraged to follow-up " sooner if increased pain, fever, or swelling is noted.    Gisela expressed understanding and agreement with the plan for care.     I, Ines Mederos, completed the PFSH and ROS. I then acted as a scribe for MARYJO Sellers, for the remainder of the visit.  Ines ALBERTS, RN, DNP student    I agree with the PFSH and ROS as completed by the MARYJO Student, except for changes made by me.  The remainder of the encounter was performed by me and scribed by the MARYJO Student.  The scribed note accurately reflects my personal services and decisions made by me.  Kiki Lerner DNP, MARYJO HUBER          Again, thank you for allowing me to participate in the care of your patient.      Sincerely,    MAYO Casey CNP       Quality 431: Preventive Care And Screening: Unhealthy Alcohol Use - Screening: Patient screened for unhealthy alcohol use using a single question and scores less than 2 times per year Quality 226: Preventive Care And Screening: Tobacco Use: Screening And Cessation Intervention: Patient screened for tobacco use and is an ex/non-smoker Detail Level: Detailed Quality 130: Documentation Of Current Medications In The Medical Record: Current Medications Documented